# Patient Record
Sex: FEMALE | Race: WHITE | NOT HISPANIC OR LATINO
[De-identification: names, ages, dates, MRNs, and addresses within clinical notes are randomized per-mention and may not be internally consistent; named-entity substitution may affect disease eponyms.]

---

## 2017-09-27 ENCOUNTER — APPOINTMENT (OUTPATIENT)
Dept: CARDIOLOGY | Facility: CLINIC | Age: 72
End: 2017-09-27
Payer: MEDICARE

## 2017-09-27 PROBLEM — Z00.00 ENCOUNTER FOR PREVENTIVE HEALTH EXAMINATION: Status: ACTIVE | Noted: 2017-09-27

## 2017-09-27 PROCEDURE — 99204 OFFICE O/P NEW MOD 45 MIN: CPT

## 2017-09-27 PROCEDURE — 93000 ELECTROCARDIOGRAM COMPLETE: CPT

## 2017-10-11 ENCOUNTER — APPOINTMENT (OUTPATIENT)
Dept: CARDIOLOGY | Facility: CLINIC | Age: 72
End: 2017-10-11
Payer: MEDICARE

## 2017-10-11 PROCEDURE — 93306 TTE W/DOPPLER COMPLETE: CPT

## 2017-10-11 PROCEDURE — 93880 EXTRACRANIAL BILAT STUDY: CPT

## 2017-11-01 ENCOUNTER — APPOINTMENT (OUTPATIENT)
Dept: CARDIOLOGY | Facility: CLINIC | Age: 72
End: 2017-11-01
Payer: MEDICARE

## 2017-11-01 PROCEDURE — 99214 OFFICE O/P EST MOD 30 MIN: CPT

## 2018-11-13 ENCOUNTER — APPOINTMENT (OUTPATIENT)
Dept: CARDIOLOGY | Facility: CLINIC | Age: 73
End: 2018-11-13
Payer: MEDICARE

## 2018-11-13 PROCEDURE — 93306 TTE W/DOPPLER COMPLETE: CPT

## 2018-11-13 PROCEDURE — 93880 EXTRACRANIAL BILAT STUDY: CPT

## 2018-11-14 ENCOUNTER — APPOINTMENT (OUTPATIENT)
Dept: CARDIOLOGY | Facility: CLINIC | Age: 73
End: 2018-11-14

## 2022-01-02 ENCOUNTER — TRANSCRIPTION ENCOUNTER (OUTPATIENT)
Age: 77
End: 2022-01-02

## 2022-07-28 ENCOUNTER — NON-APPOINTMENT (OUTPATIENT)
Age: 77
End: 2022-07-28

## 2022-12-21 ENCOUNTER — OFFICE (OUTPATIENT)
Dept: URBAN - METROPOLITAN AREA CLINIC 8 | Facility: CLINIC | Age: 77
Setting detail: OPHTHALMOLOGY
End: 2022-12-21
Payer: MEDICARE

## 2022-12-21 DIAGNOSIS — H04.123: ICD-10-CM

## 2022-12-21 DIAGNOSIS — H25.13: ICD-10-CM

## 2022-12-21 DIAGNOSIS — H40.013: ICD-10-CM

## 2022-12-21 PROCEDURE — 92014 COMPRE OPH EXAM EST PT 1/>: CPT | Performed by: OPHTHALMOLOGY

## 2022-12-21 ASSESSMENT — REFRACTION_MANIFEST
OU_VA: 20/20
OD_AXIS: 090
OS_VA2: 20/30(J2)
OD_SPHERE: +2.00
OS_VA1: 20/20-2
OS_SPHERE: +2.25
OS_ADD: +3.00
OS_VA1: 20/25
OS_VA2: 20/20(J1+)
OS_ADD: +3.00
OS_CYLINDER: -2.25
OD_ADD: +3.00
OS_AXIS: 085
OD_VA1: 20/25
OD_AXIS: 111
OS_AXIS: 086
OD_CYLINDER: -0.25
OD_SPHERE: +2.50
OS_CYLINDER: -1.00
OD_CYLINDER: -1.50
OD_VA1: 20/25+
OD_VA2: 20/20(J1+)
OS_SPHERE: +3.50
OD_ADD: +3.00
OD_VA2: 20/30(J2)

## 2022-12-21 ASSESSMENT — LID POSITION - DERMATOCHALASIS
OS_DERMATOCHALASIS: T +1
OD_DERMATOCHALASIS: T +1

## 2022-12-21 ASSESSMENT — REFRACTION_CURRENTRX
OD_CYLINDER: -0.25
OD_AXIS: 086
OS_SPHERE: +2.25
OD_OVR_VA: 20/
OD_SPHERE: +2.00
OD_OVR_VA: 20/
OD_VPRISM_DIRECTION: PROGS
OD_ADD: +2.75
OS_OVR_VA: 20/
OS_CYLINDER: -1.00
OS_OVR_VA: 20/
OS_AXIS: 084
OS_VPRISM_DIRECTION: PROGS
OS_ADD: +2.75

## 2022-12-21 ASSESSMENT — SPHEQUIV_DERIVED
OS_SPHEQUIV: 2.375
OD_SPHEQUIV: 2
OS_SPHEQUIV: 1.75
OD_SPHEQUIV: 1.875
OS_SPHEQUIV: 2.75
OD_SPHEQUIV: 1.75

## 2022-12-21 ASSESSMENT — AXIALLENGTH_DERIVED
OD_AL: 21.8497
OS_AL: 21.6405
OS_AL: 21.7641
OD_AL: 21.9336
OD_AL: 21.8915
OS_AL: 21.9733

## 2022-12-21 ASSESSMENT — REFRACTION_AUTOREFRACTION
OD_AXIS: 111
OS_SPHERE: +3.75
OS_CYLINDER: -2.00
OD_CYLINDER: -1.00
OD_SPHERE: +2.50
OS_AXIS: 086

## 2022-12-21 ASSESSMENT — TEAR BREAK UP TIME (TBUT)
OS_TBUT: 8-10 SECS
OD_TBUT: 8-10 SECS

## 2022-12-21 ASSESSMENT — TONOMETRY
OS_IOP_MMHG: 16
OD_IOP_MMHG: 16

## 2022-12-21 ASSESSMENT — KERATOMETRY
OD_K2POWER_DIOPTERS: 46.75
OD_K1POWER_DIOPTERS: 46.25
OS_K2POWER_DIOPTERS: 46.75
METHOD_AUTO_MANUAL: AUTO
OD_AXISANGLE_DEGREES: 072
OS_K1POWER_DIOPTERS: 46.00
OS_AXISANGLE_DEGREES: 165

## 2022-12-21 ASSESSMENT — VISUAL ACUITY
OS_BCVA: 20/30
OD_BCVA: 20/30-1

## 2022-12-21 ASSESSMENT — PACHYMETRY
OD_CT_UM: 563
OS_CT_UM: 555
OD_CT_CORRECTION: -1
OS_CT_CORRECTION: -1

## 2022-12-21 ASSESSMENT — SUPERFICIAL PUNCTATE KERATITIS (SPK)
OD_SPK: T
OS_SPK: T

## 2022-12-21 ASSESSMENT — CONFRONTATIONAL VISUAL FIELD TEST (CVF)
OS_FINDINGS: FULL
OD_FINDINGS: FULL

## 2023-06-14 ENCOUNTER — OFFICE (OUTPATIENT)
Dept: URBAN - METROPOLITAN AREA CLINIC 8 | Facility: CLINIC | Age: 78
Setting detail: OPHTHALMOLOGY
End: 2023-06-14
Payer: MEDICARE

## 2023-06-14 DIAGNOSIS — H40.013: ICD-10-CM

## 2023-06-14 DIAGNOSIS — H04.123: ICD-10-CM

## 2023-06-14 DIAGNOSIS — H25.13: ICD-10-CM

## 2023-06-14 DIAGNOSIS — H15.101: ICD-10-CM

## 2023-06-14 PROCEDURE — 99213 OFFICE O/P EST LOW 20 MIN: CPT | Performed by: OPHTHALMOLOGY

## 2023-06-14 PROCEDURE — 92133 CPTRZD OPH DX IMG PST SGM ON: CPT | Performed by: OPHTHALMOLOGY

## 2023-06-14 ASSESSMENT — REFRACTION_MANIFEST
OS_AXIS: 086
OU_VA: 20/20
OD_VA2: 20/20(J1+)
OS_ADD: +3.00
OD_VA1: 20/25
OD_CYLINDER: -0.25
OD_VA2: 20/30(J2)
OD_AXIS: 090
OD_ADD: +3.00
OD_AXIS: 111
OS_CYLINDER: -2.25
OS_SPHERE: +2.25
OS_AXIS: 085
OS_ADD: +3.00
OS_VA1: 20/20-2
OD_SPHERE: +2.50
OS_CYLINDER: -1.00
OD_VA1: 20/25+
OD_CYLINDER: -1.50
OS_VA1: 20/25
OS_VA2: 20/20(J1+)
OD_SPHERE: +2.00
OD_ADD: +3.00
OS_VA2: 20/30(J2)
OS_SPHERE: +3.50

## 2023-06-14 ASSESSMENT — CONFRONTATIONAL VISUAL FIELD TEST (CVF)
OS_FINDINGS: FULL
OD_FINDINGS: FULL

## 2023-06-14 ASSESSMENT — KERATOMETRY
OS_K1POWER_DIOPTERS: 47.00
OD_AXISANGLE_DEGREES: 062
OD_K1POWER_DIOPTERS: 46.25
METHOD_AUTO_MANUAL: AUTO
OS_AXISANGLE_DEGREES: 090
OD_K2POWER_DIOPTERS: 46.50
OS_K2POWER_DIOPTERS: 47.00

## 2023-06-14 ASSESSMENT — REFRACTION_CURRENTRX
OS_VPRISM_DIRECTION: PROGS
OS_SPHERE: +2.25
OD_OVR_VA: 20/
OD_CYLINDER: -0.25
OS_CYLINDER: -1.00
OD_VPRISM_DIRECTION: PROGS
OS_OVR_VA: 20/
OD_ADD: +2.75
OS_AXIS: 084
OD_SPHERE: +2.00
OD_OVR_VA: 20/
OS_OVR_VA: 20/
OD_AXIS: 086
OS_ADD: +2.75

## 2023-06-14 ASSESSMENT — REFRACTION_AUTOREFRACTION
OS_CYLINDER: -2.00
OD_SPHERE: +2.50
OS_SPHERE: +3.75
OS_AXIS: 085
OD_CYLINDER: -1.00
OD_AXIS: 111

## 2023-06-14 ASSESSMENT — AXIALLENGTH_DERIVED
OD_AL: 21.8891
OS_AL: 21.5705
OD_AL: 21.9733
OD_AL: 21.9311
OS_AL: 21.776
OS_AL: 21.449

## 2023-06-14 ASSESSMENT — SPHEQUIV_DERIVED
OD_SPHEQUIV: 1.875
OS_SPHEQUIV: 2.375
OS_SPHEQUIV: 1.75
OD_SPHEQUIV: 1.75
OS_SPHEQUIV: 2.75
OD_SPHEQUIV: 2

## 2023-06-14 ASSESSMENT — PACHYMETRY
OD_CT_UM: 563
OS_CT_UM: 555
OS_CT_CORRECTION: -1
OD_CT_CORRECTION: -1

## 2023-06-14 ASSESSMENT — TONOMETRY
OS_IOP_MMHG: 19
OD_IOP_MMHG: 18

## 2023-06-14 ASSESSMENT — LID POSITION - DERMATOCHALASIS
OS_DERMATOCHALASIS: T +1
OD_DERMATOCHALASIS: T +1

## 2023-06-14 ASSESSMENT — VISUAL ACUITY
OS_BCVA: 20/30
OD_BCVA: 20/30-1

## 2023-12-20 ENCOUNTER — OFFICE (OUTPATIENT)
Dept: URBAN - METROPOLITAN AREA CLINIC 8 | Facility: CLINIC | Age: 78
Setting detail: OPHTHALMOLOGY
End: 2023-12-20
Payer: MEDICARE

## 2023-12-20 DIAGNOSIS — H40.013: ICD-10-CM

## 2023-12-20 DIAGNOSIS — H25.13: ICD-10-CM

## 2023-12-20 DIAGNOSIS — H04.123: ICD-10-CM

## 2023-12-20 PROCEDURE — 92014 COMPRE OPH EXAM EST PT 1/>: CPT | Performed by: OPHTHALMOLOGY

## 2023-12-20 ASSESSMENT — REFRACTION_CURRENTRX
OS_SPHERE: +2.25
OS_VPRISM_DIRECTION: PROGS
OD_OVR_VA: 20/
OS_AXIS: 084
OD_ADD: +2.75
OD_CYLINDER: -0.25
OS_CYLINDER: -1.00
OS_OVR_VA: 20/
OD_VPRISM_DIRECTION: PROGS
OS_OVR_VA: 20/
OD_AXIS: 086
OD_SPHERE: +2.00
OS_ADD: +2.75
OD_OVR_VA: 20/

## 2023-12-20 ASSESSMENT — REFRACTION_AUTOREFRACTION
OS_CYLINDER: -2.00
OD_SPHERE: +2.50
OS_AXIS: 085
OD_AXIS: 111
OS_SPHERE: +3.75
OD_CYLINDER: -1.00

## 2023-12-20 ASSESSMENT — REFRACTION_MANIFEST
OD_CYLINDER: -0.50
OS_ADD: +3.00
OD_VA2: 20/30(J2)
OS_AXIS: 086
OD_AXIS: 090
OS_CYLINDER: -1.00
OD_SPHERE: +2.50
OS_SPHERE: +2.25
OS_VA2: 20/20(J1+)
OD_AXIS: 111
OD_SPHERE: +2.00
OD_ADD: +3.00
OS_ADD: +3.00
OD_ADD: +3.00
OS_AXIS: 085
OS_VA1: 20/30
OD_CYLINDER: -0.25
OD_VA1: 20/30
OU_VA: 20/30
OD_VA2: 20/20(J1+)
OS_VA1: 20/20-2
OD_VA1: 20/25+
OS_VA2: 20/30(J2)
OS_SPHERE: +2.25
OS_CYLINDER: -1.00

## 2023-12-20 ASSESSMENT — SPHEQUIV_DERIVED
OS_SPHEQUIV: 1.75
OS_SPHEQUIV: 1.75
OD_SPHEQUIV: 2.25
OS_SPHEQUIV: 2.75
OD_SPHEQUIV: 2
OD_SPHEQUIV: 1.875

## 2023-12-20 ASSESSMENT — LID POSITION - DERMATOCHALASIS
OD_DERMATOCHALASIS: T +1
OS_DERMATOCHALASIS: T +1

## 2023-12-20 ASSESSMENT — CONFRONTATIONAL VISUAL FIELD TEST (CVF)
OS_FINDINGS: FULL
OD_FINDINGS: FULL

## 2024-05-14 PROBLEM — Q10.3 PSEUDOSTRABISMUS: Status: ACTIVE | Noted: 2024-05-14

## 2024-06-14 ENCOUNTER — APPOINTMENT (OUTPATIENT)
Dept: ORTHOPEDIC SURGERY | Facility: CLINIC | Age: 79
End: 2024-06-14
Payer: MEDICARE

## 2024-06-14 PROCEDURE — 99214 OFFICE O/P EST MOD 30 MIN: CPT | Mod: 57

## 2024-06-14 PROCEDURE — 99204 OFFICE O/P NEW MOD 45 MIN: CPT | Mod: 57

## 2024-06-14 PROCEDURE — 25600 CLTX DST RDL FX/EPHYS SEP WO: CPT | Mod: RT

## 2024-06-14 RX ORDER — ESCITALOPRAM OXALATE 20 MG/1
20 TABLET, FILM COATED ORAL
Refills: 0 | Status: ACTIVE | COMMUNITY

## 2024-06-14 RX ORDER — AMLODIPINE BESYLATE 5 MG/1
TABLET ORAL
Refills: 0 | Status: ACTIVE | COMMUNITY

## 2024-06-14 RX ORDER — SOLIFENACIN SUCCINATE 10 MG/1
TABLET ORAL
Refills: 0 | Status: ACTIVE | COMMUNITY

## 2024-06-14 RX ORDER — AMMONIUM LACTATE/UREA 5 %-20 %
20-5 CREAM (GRAM) TOPICAL
Refills: 0 | Status: ACTIVE | COMMUNITY

## 2024-06-14 RX ORDER — TRIAMCINOLONE ACETONIDE 55 MCG
AEROSOL WITH ADAPTER (GRAM) NASAL
Refills: 0 | Status: ACTIVE | COMMUNITY

## 2024-06-14 RX ORDER — LEFLUNOMIDE AND DICLOFENAC SODIUM GEL 20 MG-1 %
20 & 1 KIT MISCELLANEOUS
Refills: 0 | Status: ACTIVE | COMMUNITY

## 2024-06-14 NOTE — HISTORY OF PRESENT ILLNESS
[de-identified] :  Patient presents for evaluation on RT wrist pain. Patient presents in wrist splint. States she took a fall at home and presented to [facility] ER same day for x-rays which showed fracture. Patient states she has been taking Tylenol as needed. Patient is RHD.

## 2024-06-14 NOTE — DISCUSSION/SUMMARY
[de-identified] : I reviewed patient's radiographs and discussed her condition and treatment options.  I wrapped her in volar resting splint today and provided strict instructions to have her roommate remove the bracelets once she gets home today and rewrap the splint.  Follow up in 1 week for cast placement.   Patient voiced understanding and agreement with the plan.

## 2024-06-14 NOTE — PHYSICAL EXAM
[] : good capillary refill in all fingers [Right] : right wrist [Outside films reviewed] : Outside films reviewed [FreeTextEntry3] : skin intact out of sugartong splint (3 Mj forever bracelets present) [FreeTextEntry8] : distal radius and ulna fractures

## 2024-06-19 ENCOUNTER — APPOINTMENT (OUTPATIENT)
Dept: ORTHOPEDIC SURGERY | Facility: CLINIC | Age: 79
End: 2024-06-19
Payer: MEDICARE

## 2024-06-19 DIAGNOSIS — S52.531D COLLES' FRACTURE OF RIGHT RADIUS, SUBSEQUENT ENCOUNTER FOR CLOSED FRACTURE WITH ROUTINE HEALING: ICD-10-CM

## 2024-06-19 PROCEDURE — 99024 POSTOP FOLLOW-UP VISIT: CPT

## 2024-06-19 PROCEDURE — 73100 X-RAY EXAM OF WRIST: CPT | Mod: RT

## 2024-06-19 PROCEDURE — 29075 APPL CST ELBW FNGR SHORT ARM: CPT | Mod: 58,RT

## 2024-06-19 NOTE — PHYSICAL EXAM
[] : good capillary refill in all fingers [Right] : right wrist [The fracture is in acceptable alignment. There is progression in healing seen] : The fracture is in acceptable alignment. There is progression in healing seen [FreeTextEntry8] : distal radius and ulna fractures in cast [FreeTextEntry3] : skin intact out of sugartong splint

## 2024-06-19 NOTE — HISTORY OF PRESENT ILLNESS
[de-identified] : Patient is following up on RT wrist fx. Patient presents in a splint and a sling and states that she isn't in that much pain.  Has had bracelets removed.

## 2024-06-19 NOTE — DISCUSSION/SUMMARY
[de-identified] : I reviewed patient's radiographs and discussed her condition and treatment course. Well-padded short arm fiberglass cast placed today.  I instructed patient to keep cast clean and dry, avoid scratching or putting anything in the cast.  I provided my contact information to call should the cast get wet or patient have any issues with the cast.  Follow up in 2 weeks XRS OOC likely cast change.  Patient voiced understanding and agreement with the plan.

## 2024-06-26 ENCOUNTER — OFFICE (OUTPATIENT)
Dept: URBAN - METROPOLITAN AREA CLINIC 8 | Facility: CLINIC | Age: 79
Setting detail: OPHTHALMOLOGY
End: 2024-06-26
Payer: MEDICARE

## 2024-06-26 DIAGNOSIS — H25.13: ICD-10-CM

## 2024-06-26 DIAGNOSIS — H04.123: ICD-10-CM

## 2024-06-26 DIAGNOSIS — H40.013: ICD-10-CM

## 2024-06-26 PROCEDURE — 92133 CPTRZD OPH DX IMG PST SGM ON: CPT | Performed by: OPHTHALMOLOGY

## 2024-06-26 PROCEDURE — 99213 OFFICE O/P EST LOW 20 MIN: CPT | Performed by: OPHTHALMOLOGY

## 2024-06-26 ASSESSMENT — LID POSITION - DERMATOCHALASIS
OS_DERMATOCHALASIS: T +1
OD_DERMATOCHALASIS: T +1

## 2024-06-26 ASSESSMENT — CONFRONTATIONAL VISUAL FIELD TEST (CVF)
OS_FINDINGS: FULL
OD_FINDINGS: FULL

## 2024-07-09 ENCOUNTER — APPOINTMENT (OUTPATIENT)
Dept: ORTHOPEDIC SURGERY | Facility: CLINIC | Age: 79
End: 2024-07-09

## 2024-07-10 ENCOUNTER — APPOINTMENT (OUTPATIENT)
Dept: ORTHOPEDIC SURGERY | Facility: CLINIC | Age: 79
End: 2024-07-10
Payer: MEDICARE

## 2024-07-10 PROCEDURE — 73100 X-RAY EXAM OF WRIST: CPT | Mod: RT

## 2024-07-10 PROCEDURE — 99024 POSTOP FOLLOW-UP VISIT: CPT

## 2024-07-23 ENCOUNTER — APPOINTMENT (OUTPATIENT)
Dept: ORTHOPEDIC SURGERY | Facility: CLINIC | Age: 79
End: 2024-07-23
Payer: MEDICARE

## 2024-07-23 DIAGNOSIS — S52.531D COLLES' FRACTURE OF RIGHT RADIUS, SUBSEQUENT ENCOUNTER FOR CLOSED FRACTURE WITH ROUTINE HEALING: ICD-10-CM

## 2024-07-23 PROCEDURE — L3908: CPT | Mod: RT,KX

## 2024-07-23 PROCEDURE — 73100 X-RAY EXAM OF WRIST: CPT | Mod: RT

## 2024-07-23 PROCEDURE — 99024 POSTOP FOLLOW-UP VISIT: CPT

## 2024-07-23 NOTE — PHYSICAL EXAM
[Right] : right wrist [The fracture is in acceptable alignment. There is progression in healing seen] : The fracture is in acceptable alignment. There is progression in healing seen [3___] : volarflexion 3[unfilled]/5 [] : palpable radial pulse [FreeTextEntry3] : skin intact out of cast [FreeTextEntry8] : healed distal radius and ulna fractures

## 2024-07-23 NOTE — DISCUSSION/SUMMARY
[de-identified] : I reviewed patient's radiographs and discussed her condition and treatment course.  Immobilization was discontinued.  Wrist brace provided.  She may gradually resume activities as tolerated, but avoid high impact/high risk activities to avoid refracture.  Defer formal course of PT.  Follow up in 4 weeks XRs.  Patient voiced understanding and agreement with the plan.

## 2024-07-23 NOTE — HISTORY OF PRESENT ILLNESS
[de-identified] :  Since last visit, patient attest to good cast care. States no pain is eager to have it come off.

## 2024-08-21 ENCOUNTER — APPOINTMENT (OUTPATIENT)
Dept: ORTHOPEDIC SURGERY | Facility: CLINIC | Age: 79
End: 2024-08-21
Payer: MEDICARE

## 2024-08-21 DIAGNOSIS — S52.531D COLLES' FRACTURE OF RIGHT RADIUS, SUBSEQUENT ENCOUNTER FOR CLOSED FRACTURE WITH ROUTINE HEALING: ICD-10-CM

## 2024-08-21 PROCEDURE — 73100 X-RAY EXAM OF WRIST: CPT | Mod: RT

## 2024-08-21 PROCEDURE — 99024 POSTOP FOLLOW-UP VISIT: CPT

## 2024-08-21 NOTE — HISTORY OF PRESENT ILLNESS
[de-identified] :  Since last visit, patient reports improvement in brace. States she has tried to resume ADLs and has been able to without brace on.

## 2024-08-21 NOTE — PHYSICAL EXAM
[Right] : right wrist [The fracture is in acceptable alignment. There is progression in healing seen] : The fracture is in acceptable alignment. There is progression in healing seen [] : no tenderness over wrist [4___] : volarflexion 4[unfilled]/5 [FreeTextEntry3] : skin intact out of brace [FreeTextEntry8] : healed distal radius and ulna fractures  [TWNoteComboBox7] : dorsiflexion 60 degrees [TWNoteComboBox4] : volarflexion 40 degrees

## 2024-08-21 NOTE — DISCUSSION/SUMMARY
[de-identified] : I reviewed patient's radiographs and discussed her condition and treatment course.  Discontinue brace.  Defer formal course of OT.  Follow up as needed.  Patient voiced understanding and agreement with the plan.

## 2024-12-11 ENCOUNTER — OFFICE (OUTPATIENT)
Dept: URBAN - METROPOLITAN AREA CLINIC 8 | Facility: CLINIC | Age: 79
Setting detail: OPHTHALMOLOGY
End: 2024-12-11
Payer: MEDICARE

## 2024-12-11 DIAGNOSIS — H25.13: ICD-10-CM

## 2024-12-11 DIAGNOSIS — H40.013: ICD-10-CM

## 2024-12-11 DIAGNOSIS — H04.123: ICD-10-CM

## 2024-12-11 PROCEDURE — 92014 COMPRE OPH EXAM EST PT 1/>: CPT | Performed by: OPHTHALMOLOGY

## 2024-12-11 ASSESSMENT — REFRACTION_MANIFEST
OU_VA: 20/50-2
OS_AXIS: 085
OD_AXIS: 110
OS_SPHERE: +2.25
OS_VA2: 20/30(J2)
OD_SPHERE: +2.00
OS_VA1: 20/60-2
OS_ADD: +3.00
OS_AXIS: 085
OS_ADD: +3.00
OD_VA2: 20/25(J1)
OD_VA1: 20/50-1
OS_CYLINDER: -1.00
OD_CYLINDER: -0.75
OD_ADD: +3.00
OS_VA2: 20/25(J1)
OD_SPHERE: +2.00
OS_SPHERE: +2.25
OD_VA1: 20/25+
OS_VA1: 20/20-2
OD_CYLINDER: -0.25
OD_VA2: 20/30(J2)
OD_ADD: +3.00
OS_CYLINDER: -0.75
OD_AXIS: 090

## 2024-12-11 ASSESSMENT — SUPERFICIAL PUNCTATE KERATITIS (SPK)
OS_SPK: T
OD_SPK: T

## 2024-12-11 ASSESSMENT — REFRACTION_CURRENTRX
OS_VPRISM_DIRECTION: PROGS
OD_AXIS: 099
OD_OVR_VA: 20/
OS_ADD: +2.50
OD_ADD: +2.50
OD_SPHERE: +2.00
OD_CYLINDER: -0.25
OD_OVR_VA: 20/
OS_SPHERE: +2.25
OS_OVR_VA: 20/
OS_CYLINDER: -1.00
OD_VPRISM_DIRECTION: PROGS
OS_OVR_VA: 20/
OS_AXIS: 081

## 2024-12-11 ASSESSMENT — KERATOMETRY
OS_AXISANGLE_DEGREES: 143
OS_K1POWER_DIOPTERS: 46.25
METHOD_AUTO_MANUAL: AUTO
OD_AXISANGLE_DEGREES: 075
OD_K2POWER_DIOPTERS: 47.00
OS_K2POWER_DIOPTERS: 47.00
OD_K1POWER_DIOPTERS: 46.25

## 2024-12-11 ASSESSMENT — VISUAL ACUITY
OD_BCVA: 20/50-1
OS_BCVA: 20/60-1

## 2024-12-11 ASSESSMENT — PACHYMETRY
OS_CT_CORRECTION: -1
OD_CT_CORRECTION: -1
OS_CT_UM: 555
OD_CT_UM: 563

## 2024-12-11 ASSESSMENT — CONFRONTATIONAL VISUAL FIELD TEST (CVF)
OD_FINDINGS: FULL
OS_FINDINGS: FULL

## 2024-12-11 ASSESSMENT — REFRACTION_AUTOREFRACTION
OS_AXIS: 086
OD_AXIS: 112
OD_CYLINDER: -1.00
OS_CYLINDER: -2.00
OD_SPHERE: +2.00
OS_SPHERE: +3.75

## 2024-12-11 ASSESSMENT — LID POSITION - DERMATOCHALASIS
OD_DERMATOCHALASIS: T +1
OS_DERMATOCHALASIS: T +1

## 2024-12-11 ASSESSMENT — TONOMETRY
OS_IOP_MMHG: 16
OD_IOP_MMHG: 16